# Patient Record
Sex: FEMALE
[De-identification: names, ages, dates, MRNs, and addresses within clinical notes are randomized per-mention and may not be internally consistent; named-entity substitution may affect disease eponyms.]

---

## 2019-04-08 ENCOUNTER — HOSPITAL ENCOUNTER (OUTPATIENT)
Dept: HOSPITAL 92 - BICULT | Age: 49
Discharge: HOME | End: 2019-04-08
Attending: OBSTETRICS & GYNECOLOGY
Payer: COMMERCIAL

## 2019-04-08 DIAGNOSIS — N63.10: Primary | ICD-10-CM

## 2019-04-08 DIAGNOSIS — N60.01: ICD-10-CM

## 2019-04-08 NOTE — ULT
RIGHT BREAST ULTRASOUND:

 

HISTORY: 

The patient returns for right breast ultrasound to evaluate an area of asymmetric increased density i
n the upper aspect of the right breast on outside images from 4/2/2019.  These are reviewed.  Additio
yanick, old examinations dated 3/29/2018, 10/30/2014, 2/25/2013, and 2/21/2013 are reviewed.  This lar
ge area of asymmetric density which was marked on the 4/2/2019 imaging appears to be little changed f
rom those old studies.

 

The upper portion of the right breast is evaluated with ultrasound.  There are multiple slightly sept
ated cysts in the right breast including a 0.4 cm cyst at 10 o'clock, 9 cm from the nipple, a 0.3 cm 
diameter cyst at 12 o'clock 9 cm from the nipple, and a 0.6 x 0.4 x 0.7 cm septated cyst at 1 o'clock
 3 cm from the nipple.  No evidence for solid mass or other evidence for malignancy.  Considerable as
ymmetric echogenic linear tissue.

 

IMPRESSION: 

Multiple septated cysts in the upper aspect of the right breast.  BIRADS category 2, benign findings.
  Continued routine screening.

 

POS: OFF